# Patient Record
Sex: MALE | Race: WHITE | NOT HISPANIC OR LATINO | URBAN - NONMETROPOLITAN AREA
[De-identification: names, ages, dates, MRNs, and addresses within clinical notes are randomized per-mention and may not be internally consistent; named-entity substitution may affect disease eponyms.]

---

## 2020-12-07 ENCOUNTER — OFFICE (OUTPATIENT)
Dept: URBAN - NONMETROPOLITAN AREA CLINIC 16 | Facility: CLINIC | Age: 35
End: 2020-12-07
Payer: MEDICAID

## 2020-12-07 VITALS
HEIGHT: 64 IN | SYSTOLIC BLOOD PRESSURE: 122 MMHG | DIASTOLIC BLOOD PRESSURE: 81 MMHG | WEIGHT: 154 LBS | HEART RATE: 104 BPM | RESPIRATION RATE: 18 BRPM | TEMPERATURE: 96.7 F

## 2020-12-07 DIAGNOSIS — K58.9 IRRITABLE BOWEL SYNDROME WITHOUT DIARRHEA: ICD-10-CM

## 2020-12-07 DIAGNOSIS — K21.9 GASTRO-ESOPHAGEAL REFLUX DISEASE WITHOUT ESOPHAGITIS: ICD-10-CM

## 2020-12-07 DIAGNOSIS — K64.9 UNSPECIFIED HEMORRHOIDS: ICD-10-CM

## 2020-12-07 PROCEDURE — 99214 OFFICE O/P EST MOD 30 MIN: CPT | Performed by: INTERNAL MEDICINE

## 2022-09-14 ENCOUNTER — OFFICE (OUTPATIENT)
Dept: URBAN - NONMETROPOLITAN AREA CLINIC 6 | Facility: CLINIC | Age: 37
End: 2022-09-14
Payer: MEDICAID

## 2022-09-14 VITALS
HEART RATE: 105 BPM | RESPIRATION RATE: 18 BRPM | SYSTOLIC BLOOD PRESSURE: 121 MMHG | HEIGHT: 64 IN | DIASTOLIC BLOOD PRESSURE: 78 MMHG

## 2022-09-14 DIAGNOSIS — K64.9 UNSPECIFIED HEMORRHOIDS: ICD-10-CM

## 2022-09-14 DIAGNOSIS — R10.32 LEFT LOWER QUADRANT PAIN: ICD-10-CM

## 2022-09-14 DIAGNOSIS — K58.9 IRRITABLE BOWEL SYNDROME WITHOUT DIARRHEA: ICD-10-CM

## 2022-09-14 DIAGNOSIS — K21.9 GASTRO-ESOPHAGEAL REFLUX DISEASE WITHOUT ESOPHAGITIS: ICD-10-CM

## 2022-09-14 PROCEDURE — 99214 OFFICE O/P EST MOD 30 MIN: CPT | Performed by: INTERNAL MEDICINE

## 2022-09-14 NOTE — SERVICENOTES
-    Long discussion with patient and mother once again about the underlying issues going on with him as related to his digestive tract.  For his reflux he is to continue with Prilosec 40 mg daily since it seems to be working well
-   he is to restart on Metamucil capsules and just 1 daily for now.  We may increase it to in a few weeks if things are still not regulated.  I will also add Robinul 1 mg tablet, just half a tablet in the morning, with directions that he may take another half to whole on any day that the diarrhea begins
-    I will see him back in 4-6 months for recheck

## 2022-09-14 NOTE — SERVICEHPINOTES
Humble Dejesus   is a   37   year old  male   who is here today for routine follow up.   he has been seen in the past for issues related to chronic GERD, periodically gastritis from NSAID use, and IBS - D. unfortunately he said years of urgency, straining, and frequent diarrhea to where he developed chronic hemorrhoids with a component of prolapse.  I did hemorrhoid banding at an initial colonoscopy several years back but since that time he has not been terribly compliant with bowel directed therapy and has developed more hemorrhoids from continued episodes of urgency diarrhea and straining.  Unfortunately he has bipolar disorder that is fairly severe and is not fully functional in taking medicines regularly or with compliance with dietary considerations
br
br   He is staying faithfully on Prilosec every day and that seems to keep the majority of acid reflux, nausea, and regurgitation under good control.  He has been taking care to only use Tylenol products for any aches or pains that he may experience.  He took fiber and the half tablet of Levbid for a few months but has not been on any in quite some time.  His mother thinks that it worked fairly well as she can recall, though she is not completely clear on this either.  About once or twice a week he will have a day where he spends anywhere from 4-6 hours back and forth to the bathroom.  It can be in the morning, afternoon, or even the evening.

## 2023-07-17 ENCOUNTER — OFFICE (OUTPATIENT)
Dept: URBAN - NONMETROPOLITAN AREA CLINIC 6 | Facility: CLINIC | Age: 38
End: 2023-07-17
Payer: MEDICAID

## 2023-07-17 VITALS
RESPIRATION RATE: 18 BRPM | DIASTOLIC BLOOD PRESSURE: 71 MMHG | WEIGHT: 143 LBS | HEIGHT: 64 IN | HEART RATE: 93 BPM | SYSTOLIC BLOOD PRESSURE: 98 MMHG

## 2023-07-17 DIAGNOSIS — K21.9 GASTRO-ESOPHAGEAL REFLUX DISEASE WITHOUT ESOPHAGITIS: ICD-10-CM

## 2023-07-17 DIAGNOSIS — R10.32 LEFT LOWER QUADRANT PAIN: ICD-10-CM

## 2023-07-17 DIAGNOSIS — K58.9 IRRITABLE BOWEL SYNDROME WITHOUT DIARRHEA: ICD-10-CM

## 2023-07-17 DIAGNOSIS — K64.9 UNSPECIFIED HEMORRHOIDS: ICD-10-CM

## 2023-07-17 PROCEDURE — 99213 OFFICE O/P EST LOW 20 MIN: CPT | Performed by: INTERNAL MEDICINE
